# Patient Record
Sex: MALE | Race: BLACK OR AFRICAN AMERICAN
[De-identification: names, ages, dates, MRNs, and addresses within clinical notes are randomized per-mention and may not be internally consistent; named-entity substitution may affect disease eponyms.]

---

## 2017-10-02 ENCOUNTER — HOSPITAL ENCOUNTER (EMERGENCY)
Dept: HOSPITAL 35 - ER | Age: 67
Discharge: HOME | End: 2017-10-02
Payer: MEDICARE

## 2017-10-02 VITALS — WEIGHT: 150 LBS | BODY MASS INDEX: 21.47 KG/M2 | HEIGHT: 70 IN

## 2017-10-02 VITALS — SYSTOLIC BLOOD PRESSURE: 140 MMHG | DIASTOLIC BLOOD PRESSURE: 89 MMHG

## 2017-10-02 DIAGNOSIS — R42: Primary | ICD-10-CM

## 2017-10-02 DIAGNOSIS — E11.9: ICD-10-CM

## 2017-10-02 DIAGNOSIS — E78.00: ICD-10-CM

## 2021-12-24 ENCOUNTER — HOSPITAL ENCOUNTER (EMERGENCY)
Dept: HOSPITAL 35 - ER | Age: 71
Discharge: HOME | End: 2021-12-24
Payer: MEDICARE

## 2021-12-24 VITALS — SYSTOLIC BLOOD PRESSURE: 159 MMHG | DIASTOLIC BLOOD PRESSURE: 76 MMHG

## 2021-12-24 VITALS — BODY MASS INDEX: 30.78 KG/M2 | WEIGHT: 214.99 LBS | HEIGHT: 70 IN

## 2021-12-24 DIAGNOSIS — Z79.899: ICD-10-CM

## 2021-12-24 DIAGNOSIS — E11.9: ICD-10-CM

## 2021-12-24 DIAGNOSIS — R42: Primary | ICD-10-CM

## 2021-12-24 DIAGNOSIS — I10: ICD-10-CM

## 2021-12-24 DIAGNOSIS — E78.00: ICD-10-CM

## 2021-12-24 LAB
ALBUMIN SERPL-MCNC: 3.5 G/DL (ref 3.4–5)
ALT SERPL-CCNC: 30 U/L (ref 16–63)
ANION GAP SERPL CALC-SCNC: 7 MMOL/L (ref 7–16)
AST SERPL-CCNC: 19 U/L (ref 15–37)
BASOPHILS NFR BLD AUTO: 1.3 % (ref 0–2)
BILIRUB SERPL-MCNC: 0.3 MG/DL (ref 0.2–1)
BUN SERPL-MCNC: 18 MG/DL (ref 7–18)
CALCIUM SERPL-MCNC: 8.8 MG/DL (ref 8.5–10.1)
CHLORIDE SERPL-SCNC: 105 MMOL/L (ref 98–107)
CO2 SERPL-SCNC: 28 MMOL/L (ref 21–32)
CREAT SERPL-MCNC: 1.3 MG/DL (ref 0.7–1.3)
EOSINOPHIL NFR BLD: 5 % (ref 0–3)
ERYTHROCYTE [DISTWIDTH] IN BLOOD BY AUTOMATED COUNT: 14.8 % (ref 10.5–14.5)
GLUCOSE SERPL-MCNC: 158 MG/DL (ref 74–106)
GRANULOCYTES NFR BLD MANUAL: 58.5 % (ref 36–66)
HCT VFR BLD CALC: 41.9 % (ref 42–52)
HGB BLD-MCNC: 13.7 GM/DL (ref 14–18)
LYMPHOCYTES NFR BLD AUTO: 20.9 % (ref 24–44)
MCH RBC QN AUTO: 26.2 PG (ref 26–34)
MCHC RBC AUTO-ENTMCNC: 32.6 G/DL (ref 28–37)
MCV RBC: 80.3 FL (ref 80–100)
MONOCYTES NFR BLD: 14.3 % (ref 1–8)
NEUTROPHILS # BLD: 2.2 THOU/UL (ref 1.4–8.2)
PLATELET # BLD: 156 THOU/UL (ref 150–400)
POTASSIUM SERPL-SCNC: 4.2 MMOL/L (ref 3.5–5.1)
PROT SERPL-MCNC: 7.2 G/DL (ref 6.4–8.2)
RBC # BLD AUTO: 5.22 MIL/UL (ref 4.5–6)
SODIUM SERPL-SCNC: 140 MMOL/L (ref 136–145)
WBC # BLD AUTO: 3.8 THOU/UL (ref 4–11)

## 2021-12-27 NOTE — EKG
[place] [address] [address] [facility] [employer]
[place]  [address]
Phone:  [phone]                    ELECTROCARDIOGRAM REPORT      
_______________________________________________________________________________
 
Name:       JOANNE CONDE               Room #:                     DEP [facility]#:      [id number]     Account #:      [id number]  
Admission:  21    Attend Phys:                          
Discharge:  21    Date of Birth:  [birth year]  
                                                          Report #: [id number]
   [id number]
_______________________________________________________________________________
                         [facility] ED
                                       
Test Date:    2021               Test Time:    17:52:48
Pat Name:     JOANNE CONDE            Department:   
Patient ID:   [id number]            Room:          
Gender:                               Technician:   
:          1950               Requested By: Braydon Toscano
Order Number: [id number] MD:   Jose Alfredo Varela
                                 Measurements
Intervals                              Axis          
Rate:         60                       P:            4
OR:           190                      QRS:          -11
QRSD:         103                      T:            4
QT:           397                                    
QTc:          397                                    
                           Interpretive Statements
Sinus rhythm
Borderline T abnormalities, inferior leads
Baseline wander in lead(s) I,II,aVR
Compared to ECG 10/02/2017 09:04:21
T-wave abnormality now present
Electronically Signed On 2021 7:16:07 CST by Jose Alfredo Varela
[email or web address]
 
 
 
 
 
 
 
 
 
 
 
 
 
 
 
 
 
 
 
 
  <ELECTRONICALLY SIGNED>
   By: Jose Alfredo Varela MD, [facility]    
  21     0716
D: 21                           _____________________________________
T: 21                           Jose Alfredo Varela MD, FACC      /EPI